# Patient Record
Sex: FEMALE
[De-identification: names, ages, dates, MRNs, and addresses within clinical notes are randomized per-mention and may not be internally consistent; named-entity substitution may affect disease eponyms.]

---

## 2021-11-29 ENCOUNTER — RESULT REVIEW (OUTPATIENT)
Age: 71
End: 2021-11-29

## 2022-11-08 ENCOUNTER — APPOINTMENT (OUTPATIENT)
Dept: AFTER HOURS CARE | Facility: EMERGENCY ROOM | Age: 72
End: 2022-11-08

## 2022-11-08 DIAGNOSIS — U07.1 COVID-19: ICD-10-CM

## 2022-11-08 PROBLEM — Z00.00 ENCOUNTER FOR PREVENTIVE HEALTH EXAMINATION: Status: ACTIVE | Noted: 2022-11-08

## 2022-11-08 PROCEDURE — 99204 OFFICE O/P NEW MOD 45 MIN: CPT | Mod: CS,95

## 2022-11-08 RX ORDER — NIRMATRELVIR AND RITONAVIR 300-100 MG
20 X 150 MG & KIT ORAL
Qty: 1 | Refills: 0 | Status: ACTIVE | COMMUNITY
Start: 2022-11-08 | End: 1900-01-01

## 2022-11-08 NOTE — PHYSICAL EXAM
[No Acute Distress] : no acute distress [No Respiratory Distress] : no respiratory distress  [Speech Grossly Normal] : speech grossly normal

## 2022-11-08 NOTE — PLAN
[With new medications prescribed] : Treat in place: with new medications prescribed [FreeTextEntry1] : Paxlovid\par \par You were evaluated during your Telehealth visit for symptoms of the COVID-19 virus. Per [facility]’s infectious disease treatment guidelines, you are a candidate for treatment with the anti-viral medication Paxlovid. Please seek medical attention if you develop worsening shortness of breath, vomiting, or you have any concerns.\par \par Information about Paxlovid: [email or web address] Fact sheet: https://www.covid19oralrx-[email or web address]/files/Clean_EUA-Fact-Sheet-for-Patients,-Parents,-and-Caregivers-COVID-19-Oral-Antiviral.pdf\par \par [facility] \par "Patient education: COVID-19 overview (The Basics)\par \par Written by the doctors and editors at [facility]\par Please read the Disclaimer at the end of this page.\par \par What is COVID-19?\par COVID-19 stands for "coronavirus disease 2019." It is caused by a virus called SARS-CoV-2. The virus first appeared in late 2019 and quickly spread around the world.\par \par What are the symptoms of COVID-19?\par Symptoms usually start 4 or 5 days after a person is infected with the virus. But in some people, it can take up to 2 weeks for symptoms to appear. Some people never show symptoms at all.\par \par When symptoms do happen, they can include:\par \par ?Fever\par ?Cough\par ?Trouble breathing\par ?Feeling tired\par ?Shaking chills\par ?Muscle aches\par ?Headache\par ?Sore throat\par ?Runny or stuffy nose\par ?Problems with sense of smell or taste\par Some people have digestive problems like nausea or diarrhea. There have also been some reports of rashes or other skin symptoms. For example, some people with COVID-19 get reddish-purple spots on their fingers or toes. But it's not clear why or how often this happens.\par \par For most people, symptoms will get better within a few days to weeks. But a small number of people get very sick and stop being able to breathe on their own. In severe cases, their organs stop working, which can lead to death.\par \par Some people with COVID-19 continue to have some symptoms for weeks or months. This seems to be more likely in people who are sick enough to need to stay in the hospital. But this can also happen in people who did not get very sick. Doctors are still learning about the long-term effects of COVID-19.\par \par While children can get COVID-19, they are less likely than adults to have severe symptoms. More information about COVID-19 and children is available separately. (See "Patient education: COVID-19 and children (The Basics)".)\par \par Am I at risk for getting seriously ill?\par It depends on your age and health. In some people, COVID-19 leads to serious problems like pneumonia, not getting enough oxygen, heart problems, or even death. This risk gets higher as people get older. It is also higher in people who have other health problems like serious heart disease, chronic kidney disease, type 2 diabetes, chronic obstructive pulmonary disease (COPD), sickle cell disease, or obesity. People who have a weak immune system for other reasons (for example, HIV infection or certain medicines), asthma, cystic fibrosis, type 1 diabetes, or high blood pressure might also be at higher risk for serious problems.\par \par How is COVID-19 spread?\par The virus that causes COVID-19 mainly spreads from person to person. This usually happens when an infected person coughs, sneezes, or talks near other people. The virus is passed through tiny particles from the infected person's lungs and airway. These particles can easily travel through the air to other people who are nearby. In some cases, like in indoor spaces where the same air keeps being blown around, virus in the particles might be able to spread to other people who are farther away.\par \par The virus can be passed easily between people who live together. But it can also spread at gatherings where people are talking close together, shaking hands, hugging, sharing food, or even singing together. Eating at restaurants raises the risk of infection, since people tend to be close to each other and not covering their faces. Doctors also think it is possible to get infected if you touch a surface that has the virus on it and then touch your mouth, nose, or eyes. However, this is probably not very common.\par \par A person can be infected, and spread the virus to others, even without having any symptoms.\par \par What are variants?\par Viruses constantly change or "mutate." When this happens, a new strain or "variant" can form. Most of the time, new variants do not change the way a virus works. But when a variant has changes in important parts of the virus, it can act differently.\par \par Experts have discovered several new variants of the virus that causes COVID-19. Some variants seem to spread more easily than the original virus. Certain variants might also make people sicker than others.\par \par Experts are studying the different variants. This will help them better understand how far they have spread, whether they affect people differently, and how well different vaccines protect against them.\par \par The more people who get vaccinated against COVID-19, the harder it will be for the virus to form new variants.\par \par Is there a test for the virus that causes COVID-19?\par Yes. If your doctor or nurse suspects you have COVID-19, they might take a swab from inside your nose or mouth for testing. In some cases, they might take a sample of your saliva. These tests can help your doctor figure out if you have COVID-19 or another illness.\par \par There are 2 types of tests used to diagnose COVID-19:\par \par ?Molecular tests – These look for the genetic material from the virus. They are also called "nucleic acid tests." You can get a molecular test at a doctor's office, clinic, or pharmacy. There are also places that make these tests available for lots of people, often at drive-through locations. Depending on the lab, it can take up to several days to get test results back.\par Molecular tests are the best way to know if a person has COVID-19. That's because they can detect even very low levels of virus in the body.\par ?Antigen tests – These look for proteins from the virus. They can give results faster than most molecular tests. You can do an antigen test at a doctor's office, clinic, pharmacy, or through some organizations that make testing available in other places. You can also buy antigen tests to use at home.\par Antigen tests are not as accurate as molecular tests. They are more likely to give "false negative" results. This is when the test comes back negative even though the person actually is infected. But antigen tests can still be useful in some situations, when results are needed quickly or a molecular test is not available. For example, if a person has early symptoms of COVID-19, an antigen test can be accurate enough to detect virus in their body. If a person gets an antigen test and the result is negative, a molecular test might be needed to confirm they do not have the virus in their body. This might be done if the person has symptoms or knows they were exposed the virus.\par There is also a blood test that can show if a person has had COVID-19 in the past. This is called an "antibody" test. Antibody tests are generally not used on their own to diagnose COVID-19 or make decisions about care. But experts can use them to learn how many people in a certain area were infected without knowing it.\par \par Can COVID-19 be prevented?\par The best way to prevent COVID-19 is to get vaccinated. In the [place], people age 5 and older can get a vaccine. People age 12 and older should also get a "booster" shot to give them extra protection. People who are fully vaccinated are at much lower risk of getting sick from the virus.\par \par More information about COVID-19 vaccines and boosters is available separately. (See "Patient education: COVID-19 vaccines (The Basics)".)\par \par In addition to vaccination, there are other things you can do to help protect yourself and others. These include:\par \par ?Face masks – Wearing a mask is most important when you need to be in public around other people. Make sure your mask covers your mouth and nose.\par You can buy cloth masks and disposable masks in stores or online (figure 1). Cloth masks work best if they have several layers of fabric. Some people prefer to use "respirator" masks that can filter out even very tiny air particles. These include "N95" and "KN95" masks. They give more protection than fabric or disposable masks. These might be a better choice for people who have a weak immune system or other health conditions. Whatever type of mask you use, it's important that it fits snugly over your face with no gaps. You can improve the fit by using a mask with an adjustable nose wire, adjusting or knotting the ear loops to make it tighter, or wearing a cloth mask on top of a disposable mask.\par ?Social distancing – It's most important to avoid contact with people who are sick. But "social distancing" also means staying at least 6 feet (about 2 meters) from anyone outside your household. That's because the virus can spread easily through close contact, and it's not always possible to know who is infected.\par ?Hand washing – Wash your hands with soap and water often. This also helps protect you from other illnesses, like the flu or the common cold. Try to avoid touching your face if you have not washed your hands.\par Make sure to rub your hands with soap for at least 20 seconds, cleaning your wrists, fingernails, and in between your fingers. Then rinse your hands and dry them with a paper towel you can throw away. If you are not near a sink, you can use a hand sanitizing gel to clean your hands. The gels with at least 60 percent alcohol work the best.\par ?Staying safe when traveling – Any form of travel, especially if you spend time in crowded places like airports, increases your risk of getting and spreading infection.\par Be sure to check what the rules are in the area you are visiting. For example, depending on the situation, you might need to have a negative COVID-19 test, show proof of vaccination, or "self-quarantine" for some length of time after traveling. Information about traveling to or from the [place] is available online at www.travel.state.gov.\par When can I stop wearing a mask?\par In general, experts recommend continuing to take the steps above if you are in an area where the COVID-19 "community level" is high. In the [place], you can check the level in your area here: https://www.cdc.gov/coronavirus/2019-ncov/your-health/covid-by-county.html. It's also a good idea to take extra steps to protect yourself if you are at high risk for severe illness.\par \par In places where the COVID-19 level is not high, many people wonder when it's safe to stop doing these things. The answer to this depends on:\par \par ?Your health and how likely you are to get very sick if you do get COVID-19\par ?Whether you live with people who are at high risk for serious illness\par ?How comfortable you are taking some amount of risk\par The answers to these questions will be different for everyone. Some people choose to continue to wear a mask in public or in large groups. Other people are comfortable doing some activities without a mask. Different activities have different levels of risk.\par \par You should continue to wear a mask around other people if you:\par \par ?Have symptoms that could be caused by COVID-19\par ?Have recently tested positive for the virus\par ?Have recently been exposed to COVID-19\par Some businesses and events require masks. Experts also recommend wearing a mask on airplanes, trains, buses, and other forms of public transportation.\par \par What should I do if I have symptoms?\par If you have a fever, cough, trouble breathing, or other symptoms of COVID-19, call your doctor or nurse. They will ask about your symptoms. They might also ask about any recent travel and whether you have been around anyone who might have been infected. Then they can tell you if you should come in or go somewhere else to be tested.\par \par If your symptoms are not severe, it is best to call before you go in. The staff can tell you what to do and whether you need to be seen in person. Many people with only mild symptoms should stay home and avoid other people until they get better. If you do need to go to the clinic or hospital, be sure to wear a mask. This helps protect other people. The staff might also have you wait someplace away from other people.\par \par If you are severely ill and need to go to the clinic or hospital right away, you should still call ahead if possible. This way the staff can care for you while taking steps to protect others. If you think you are having a medical emergency, call for an ambulance (in the [place] and [place], call 9-1-1).\par \par What if I feel fine but think I was exposed?\par If you think you were in close contact with someone with COVID-19, what to do next depends on whether you are vaccinated. It also depends on how long ago you got the vaccine and whether you have had a booster shot. Although people who are fully vaccinated are less likely to get sick and infect others, it can still happen. This is why it's important to take steps to lower this risk.\par \par First, think about these questions:\par \par ?Have you gotten a booster shot?\par ?Have you had 2 doses of the Pfizer or Moderna vaccine within the last 6 months?\par ?Have you had the Josh and Josh vaccine within the last 2 months?\par If you answered "yes" to any of the above questions, experts recommend doing the following after being exposed to someone with COVID-19:\par \par ?You do not need to self-quarantine. But you should wear a mask around all other people for 10 days.\par ?If possible, get tested 5 days after the exposure:\par •If your test is negative, continue to wear a mask around other people until 10 total days have passed\par •If your test is positive, stay home and "self-isolate" for at least 5 days\par ?If you start to have symptoms at any point, stay home and get tested again\par If you have not been vaccinated at all, or if you answered "no" to all of the above questions, experts recommend doing the following:\par \par ?Self-quarantine for 5 days after the exposure. This means staying home and away from other people as much as possible. If you need to be around people, like in your home, wear a mask.\par ?If possible, get tested 5 days after the exposure:\par •If your test is negative, continue to wear a mask around other people until 10 total days have passed\par •If your test is positive, continue to stay home and "self-isolate" for at least another 5 days\par ?If you start to have symptoms at any point, stay home and get tested again\par The guidance around what to do after being exposed has changed over time. That's because experts have learned more about the virus and when a person is most likely to infect others. If you are not sure whether you need to self-quarantine, or when you can go back to your normal activities, ask your doctor or nurse.\par \par How is COVID-19 treated?\par Many people will be able to stay home while they get better. But people with serious symptoms or other health problems might need to go to the hospital.\par \par ?Mild illness – Mild illness means you might have symptoms like fever and cough, but you do not have trouble breathing. Most people with COVID-19 have mild illness and can rest at home until they get better. This usually takes about 2 weeks, but it's not the same for everyone.\par If you are recovering from COVID-19, it's important to stay home and "self-isolate" while you are most likely to spread the virus. Self-isolation means staying apart from other people, even the people you live with. When you can stop self-isolation will depend on how long it has been since you had symptoms, and in some cases, whether you have had a negative test (showing that the virus is no longer in your body). If you are generally healthy and your symptoms are improving (or you don't have symptoms), experts recommend self-isolating for at least 5 days. The 5 days starts the day after you develop symptoms or get tested. After this, you should wear a mask around all other people for 5 more days.\par If you are not sure how long to self-isolate, or if you still have symptoms after 5 days, talk to your doctor or nurse. You should also check with your doctor or nurse if you have a weakened immune system.\par If you are at risk for getting seriously ill, doctors might recommend treatment even if you only have mild symptoms. This can lower your risk of getting sicker. Options might include:\par •"Antiviral" pills that you take for a few days\par •A different antiviral medicine that is given by IV\par •A treatment called "monoclonal antibodies" that is given by IV or as a shot\par Doctors also might recommend being part of a clinical trial. This is a scientific study that tests new medicines to see how well they work. Do not try any new medicines or treatments without talking to a doctor.\par ?Severe illness – If you have more severe illness with trouble breathing, you might need to stay in the hospital, possibly in the intensive care unit (also called the "ICU"). While you are there, you will most likely be in a special isolation room. Only medical staff will be allowed in the room, and they will have to wear special gowns, gloves, masks, and eye protection.\par The doctors and nurses can monitor and support your breathing and other body functions and make you as comfortable as possible. You might need extra oxygen to help you breathe easily. If you are having a very hard time breathing, you might need a breathing tube. The tube goes down your throat and into your lungs. It is connected to a machine to help you breathe, called a "ventilator." You might also get medicines that have been shown to help some people with severe COVID-19.\par What should I do if someone in my home has COVID-19?\par If someone in your home has COVID-19, there are additional things you can do to protect yourself and others:\par \par ?Keep the sick person away from others – The sick person should stay in a separate room, and use a different bathroom if possible. They should also eat in their own room.\par Experts also recommend that the person stay away from pets in the house until they are better.\par ?Have them wear a mask – The sick person should wear a mask when they are in the same room as other people. If they can't wear a mask, you can help protect yourself by covering your face when you are in the room with them.\par ?Wash hands – Wash your hands with soap and water often.\par ?Clean often – Here are some specific things that can help:\par •Wear disposable gloves when you clean. It's also a good idea to wear gloves when you have to touch the sick person's laundry, dishes, utensils, or trash. Wash your hands after removing your gloves.\par •Regularly clean things that are touched a lot. This includes counters, bedside tables, doorknobs, computers, phones, and bathroom surfaces.\par •Clean things in your home with soap and water, but also use disinfectants on appropriate surfaces. Some cleaning products work well to kill bacteria, but not viruses, so it's important to check labels. The [place] Environmental Protection Agency (EPA) has a list of products here: www.epa.gov/pesticide-registration/list-n-disinfectants-use-against-sars-cov-2.\par What if I am pregnant?\par More information about COVID-19 and pregnancy is available separately. (See "Patient education: COVID-19 and pregnancy (The Basics)".)\par \par If you are pregnant and you have questions about COVID-19, talk to your doctor, nurse, or midwife. They can help.\par \par How can I take care of my mental health?\par The COVID-19 pandemic has affected everyone in different ways. Many people have had to deal with being ill or caring for others who are sick. Others have lost family members or friends to COVID-19. And most people have had to deal with their lives changing in some way, sometimes permanently.\par \par It's normal to be tired of thinking about the pandemic, or to feel overwhelmed by the changing rules and guidelines. You can take care of yourself by trying to:\par \par ?Get regular exercise and eat healthy foods\par ?Get plenty of sleep\par ?Find healthy ways to handle stress, like hobbies you enjoy\par ?Find safe ways to connect with friends and family members\par If you are struggling to cope, help is available. Talk to your doctor or nurse if you feel very sad or anxious. They can recommend things that can help, or connect you with mental health resources.\par \par Where can I go to learn more?\par As we learn more about this virus, expert recommendations will continue to change. Check with your doctor or public health official to get the most updated information about how to protect yourself and others.\par \par For information about COVID-19 in your area, you can call your local public health office. In the [place], this usually means your city or town's Board of Health. Many states also have a "hotline" phone number you can call.\par \par You can find more information about COVID-19 at the following websites:\par \par ?[place] Centers for Disease Control and Prevention (CDC): www.cdc.gov/COVID19\par ?World Health Organization (WHO): www.who.int/emergencies/diseases/novel-coronavirus-2019"\par

## 2022-11-08 NOTE — HISTORY OF PRESENT ILLNESS
[Home] : at home, [unfilled] , at the time of the visit. [Other Location: e.g. Home (Enter Location, City,State)___] : at [unfilled] [Verbal consent obtained from patient] : the patient, [unfilled] [FreeTextEntry8] : 72F presents COVID day #2. Pt had COVID in 2019 which lasted one month.\par Sx: sore throat, general weakness\par No fever, chills, cough, N/V\par Home rapid test was positive 11/7/22\par PMH: cholesterol, mitral valve prolapse, cystitis\par Meds: Nitrofurantoin, simvastatin, Lexapro, spironolactone  \par Allergies: NKDA\par Vaccine status: x3\par \par (Teladoc EROD)

## 2022-11-08 NOTE — ASSESSMENT
[FreeTextEntry1] : COVID day 2 without respiratory distress. Tolerating PO. Age as mild risk factor for disease. Pros and cons of Paxlovid discussed. Pt would like proceed with Paxlovid. [place] interaction  completed. Pt to hold Simvastatin for 8 days. Isolation guidelines discussed.

## 2022-12-07 ENCOUNTER — RESULT REVIEW (OUTPATIENT)
Age: 72
End: 2022-12-07

## 2022-12-12 ENCOUNTER — APPOINTMENT (OUTPATIENT)
Dept: ULTRASOUND IMAGING | Facility: CLINIC | Age: 72
End: 2022-12-12

## 2022-12-12 ENCOUNTER — OUTPATIENT (OUTPATIENT)
Dept: OUTPATIENT SERVICES | Facility: HOSPITAL | Age: 72
LOS: 1 days | End: 2022-12-12

## 2022-12-12 PROCEDURE — 76830 TRANSVAGINAL US NON-OB: CPT | Mod: 26

## 2022-12-12 PROCEDURE — 76856 US EXAM PELVIC COMPLETE: CPT | Mod: 26

## 2023-01-05 ENCOUNTER — APPOINTMENT (OUTPATIENT)
Dept: GYNECOLOGIC ONCOLOGY | Facility: CLINIC | Age: 73
End: 2023-01-05
Payer: MEDICARE

## 2023-01-05 ENCOUNTER — NON-APPOINTMENT (OUTPATIENT)
Age: 73
End: 2023-01-05

## 2023-01-05 VITALS
WEIGHT: 142 LBS | HEART RATE: 72 BPM | DIASTOLIC BLOOD PRESSURE: 64 MMHG | OXYGEN SATURATION: 96 % | TEMPERATURE: 98.2 F | BODY MASS INDEX: 22.29 KG/M2 | HEIGHT: 67 IN | SYSTOLIC BLOOD PRESSURE: 102 MMHG

## 2023-01-05 DIAGNOSIS — R87.613 HIGH GRADE SQUAMOUS INTRAEPITHELIAL LESION ON CYTOLOGIC SMEAR OF CERVIX (HGSIL): ICD-10-CM

## 2023-01-05 PROCEDURE — 99204 OFFICE O/P NEW MOD 45 MIN: CPT

## 2023-01-05 NOTE — DISCUSSION/SUMMARY
[FreeTextEntry1] : With the aid of diagrams we reviewed the findings in detail.  We reviewed HPV its pathogenesis and the implications of an abnormal cervical cytology and the pathogenesis of dysplasia in detail. \par \par It has been reported that 40 to 58 percent of JEFF 2 lesions will regress if left untreated, while 22 percent progress to JEFF 3, and 5 percent progress to invasive cancer. ASCCP guidelines were reviewed with the patient. 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 \par \par The risks and benefits of LEEP vs. CKC were discussed which include, but are not limited to: bleeding, infection, cervical stenosis, cervical insufficiency. Possibility of needing a repeat procedure or hysterectomy was also discussed. I also discussed the possibility that no abnormality will be seen on the LEEP specimen.\par \par Office LEEP procedure is recommended in this case.\par Patient to schedule at her convenience. One of our LEEP supplies is on back order may need to be in February.

## 2023-01-05 NOTE — HISTORY OF PRESENT ILLNESS
[FreeTextEntry1] : Problem List \par 1) High grade cervical dysplasia\par \par Previous Therapy\par 1) LEEP for HGCIN in 2007\par 2) Pap 12/7/22 nilm HPV+ \par 3)  Colposcopy 12/2022\par    a) cervix 8 CIN2\par    b) ECC wnl\par \par 73 yo referred by Dr. Simpson for consultation regarding HGCIN. Patient has history of cervical dysplasia with LEEP in 2007. \par \par NKDA\par Spironolactone (for hair protection) , Lexapro, Simvastatin\par PMx: high cholesterol, mitral valve prolapse\par Pshx: denies\par FamHx: sister breast cancer x2 48 & 60, \par Social Hx: non smoker

## 2023-01-25 ENCOUNTER — NON-APPOINTMENT (OUTPATIENT)
Age: 73
End: 2023-01-25

## 2023-02-02 ENCOUNTER — APPOINTMENT (OUTPATIENT)
Dept: GYNECOLOGIC ONCOLOGY | Facility: CLINIC | Age: 73
End: 2023-02-02

## 2023-08-21 ENCOUNTER — APPOINTMENT (OUTPATIENT)
Dept: PHYSICAL MEDICINE AND REHAB | Facility: CLINIC | Age: 73
End: 2023-08-21
Payer: MEDICARE

## 2023-08-21 VITALS
WEIGHT: 145 LBS | HEART RATE: 52 BPM | RESPIRATION RATE: 18 BRPM | HEIGHT: 67 IN | DIASTOLIC BLOOD PRESSURE: 58 MMHG | BODY MASS INDEX: 22.76 KG/M2 | SYSTOLIC BLOOD PRESSURE: 87 MMHG

## 2023-08-21 DIAGNOSIS — M13.80 OTHER SPECIFIED ARTHRITIS, UNSPECIFIED SITE: ICD-10-CM

## 2023-08-21 DIAGNOSIS — M18.9 OSTEOARTHRITIS OF FIRST CARPOMETACARPAL JOINT, UNSPECIFIED: ICD-10-CM

## 2023-08-21 DIAGNOSIS — M25.542 PAIN IN JOINTS OF LEFT HAND: ICD-10-CM

## 2023-08-21 DIAGNOSIS — M23.8X1 OTHER INTERNAL DERANGEMENTS OF RIGHT KNEE: ICD-10-CM

## 2023-08-21 DIAGNOSIS — M25.461 EFFUSION, RIGHT KNEE: ICD-10-CM

## 2023-08-21 DIAGNOSIS — M23.8X2 OTHER INTERNAL DERANGEMENTS OF RIGHT KNEE: ICD-10-CM

## 2023-08-21 DIAGNOSIS — R52 PAIN, UNSPECIFIED: ICD-10-CM

## 2023-08-21 DIAGNOSIS — M15.9 POLYOSTEOARTHRITIS, UNSPECIFIED: ICD-10-CM

## 2023-08-21 DIAGNOSIS — M62.551 MUSCLE WASTING AND ATROPHY, NOT ELSEWHERE CLASSIFIED, RIGHT THIGH: ICD-10-CM

## 2023-08-21 DIAGNOSIS — Z78.9 OTHER SPECIFIED HEALTH STATUS: ICD-10-CM

## 2023-08-21 DIAGNOSIS — M24.562 CONTRACTURE, LEFT KNEE: ICD-10-CM

## 2023-08-21 DIAGNOSIS — M24.561 CONTRACTURE, RIGHT KNEE: ICD-10-CM

## 2023-08-21 DIAGNOSIS — M79.645 PAIN IN LEFT FINGER(S): ICD-10-CM

## 2023-08-21 DIAGNOSIS — M25.462 EFFUSION, RIGHT KNEE: ICD-10-CM

## 2023-08-21 DIAGNOSIS — M17.10 UNILATERAL PRIMARY OSTEOARTHRITIS, UNSPECIFIED KNEE: ICD-10-CM

## 2023-08-21 DIAGNOSIS — Z86.39 PERSONAL HISTORY OF OTHER ENDOCRINE, NUTRITIONAL AND METABOLIC DISEASE: ICD-10-CM

## 2023-08-21 PROCEDURE — 99204 OFFICE O/P NEW MOD 45 MIN: CPT

## 2023-08-21 RX ORDER — SIMVASTATIN 10 MG/1
10 TABLET, FILM COATED ORAL
Refills: 0 | Status: ACTIVE | COMMUNITY

## 2023-08-21 RX ORDER — ESCITALOPRAM OXALATE 5 MG/1
TABLET, FILM COATED ORAL
Refills: 0 | Status: ACTIVE | COMMUNITY

## 2023-08-21 RX ORDER — SPIRONOLACTONE 100 MG/1
100 TABLET ORAL
Refills: 0 | Status: ACTIVE | COMMUNITY

## 2023-08-21 RX ORDER — BIOTIN 1000 MCG
TABLET,CHEWABLE ORAL
Refills: 0 | Status: ACTIVE | COMMUNITY

## 2023-08-21 RX ORDER — MINOXIDIL 2.5 MG/1
TABLET ORAL
Refills: 0 | Status: ACTIVE | COMMUNITY

## 2023-08-21 NOTE — REVIEW OF SYSTEMS
[Patient Intake Form Reviewed] : Patient intake form was reviewed [Difficulty Walking] : difficulty walking [Anxiety] : anxiety [Fever] : no fever [Chest Pain] : no chest pain [Leg Claudication] : no intermittent leg claudication [Shortness Of Breath] : no shortness of breath

## 2023-08-21 NOTE — ASSESSMENT
[FreeTextEntry1] :  PLAN AND RECOMMENDATIONS :  We discussed differential diagnosis and clinical impression she has knee arthropathy advanced clinically with crepitus and contractures and chronic pain angel stairs she has fleeting arthralgias consider rheum work up  she has tried PT 2 years synvisc and PRP injections no help  we discussed surgery  consider partial knee replacement  given ref to Dr PORTER  The patient was given handouts to read on this condition,helpful hints ,exercises etc  handout  on OA  splint to use for CMC OA   all questions answered Recommend .symptomatic care and support brace    medications NSAIDS as needed -  OTC fine (-personal preference )-(once or twice a day), -warned of  possible GI side effects -advised to take with meals or add over the counter Nexium, if sensitive  imaging   referral to ortho knee as a abhishek   hydrotherapy /heat / cold for pain  continue  precautions including care with bending, lifting, twisting and  carrying.  relative rest and avoidance of painful activity where possible   increasing activity as discussed   return for follow up prn    The patient had the opportunity to ask questions and all were answered to their satisfaction. We will coordinate treatment with the other members of the treatment team. The patient verbalized understanding of the management/plan rationale and agreed with my recommendations. Total clinician time on the date of this encounter was at least 45 minutes- including  1 preparing to see the patient and review of prior notes, tests and other records  2 obtaining and reviewing and/ or confirming the history 3 performing a medically necessary, pertinent  and appropriate examination  4 ordering medications and supplements explaining side effects to look for ,tests,procedures,therapy and or specialty referrals 5 explaining the diagnosis or differential to the patient  6 communicating with other physicians or providers before during or after the visit. wll send note to her doctors

## 2023-08-21 NOTE — HISTORY OF PRESENT ILLNESS
[FreeTextEntry1] : Ms. BETH GUAJARDO is a very pleasant 72 year female who seen for evaluation of leg pain s knee pains as well as chronic low back pains   that has been ongoing for 20 years   without any specific injury or inciting event.  started crestor 6 years ago feels so much worst with statin  she has tried to stop or use other statins but her cardiologist is concerned as she has mitral valve issues The pain is located primarily legs  feet back ill-defined  intermittent in nature and described as cramping  the pain can last an hour to days  she thought it was restless legs syndrome she saw [facility] had PRP and synvisc injections into knees minimal help  can be either leg comes and goes randomly  knee pain worst on stairs  she had 2 years of PT  she does ex bike daily  she is a retired  from [employer]   . The pain is rated as 0/10 during today's visit, and ranges from 0-4/10. The patient's symptoms are aggravated by shopping walking cleaning   and alleviated by sleep . The patient denies any night pain, numbness/tingling, weakness, or bowel/bladder dysfunction. The patient has no other complaints at this time. her podiatrist thought these pain s referred  from her LS spine radicular  last week her left hand was painful middle finger stiff sore after one hour wore off  she is RHD

## 2023-08-21 NOTE — REASON FOR VISIT
[Initial Evaluation] : an initial evaluation [FreeTextEntry1] : ref by Dr WILSON many site leg pains 20 years

## 2023-08-21 NOTE — CONSULT LETTER
[FreeTextEntry1] : Dear Dr. MARLEN GRIDER  , DR WILSON   I had the pleasure of evaluating your patient, BETH GUAJARDO .  Thank you very much for allowing me to participate in the care of this patient. If you have any questions, please do not hesitate to contact me.   Sincerely,  Itz Fuentes MD   ABPMR Board Certified in Physical Medicine and Rehabilitation Certified Fellow of AANEM (Neuromuscular and Electrodiagnostic Medicine) Subspecialty certified in Sports Medicine (ABPMR)   of Physical Medicine and Rehabilitation [facility]

## 2023-08-21 NOTE — PHYSICAL EXAM
[FreeTextEntry1] : PHYSICAL EXAM : OBJECTIVE   GENERAL : Awake ,alert and oriented to time place and person  HEAD : normocephalic and atraumatic  NECK : supple ,no tracheal deviation ,no thyroid enlargement noted with swallowing EYES : sclera and conjunctiva normal no redness,intact extraocular movements  ENT  : ears and nose normal in appearance -hearing adequate  PULMONARY: effort normal. No respiratory distress. breathing regular. No wheezes  LYMPH : No swelling in limbs, capillary return within normal range  CVS : warm extremities,no palpitations,not short of breath, no visible jugular venous distention PSYCH : mood and affect normal ,good eye contact ,normal attention  ABDOMEN : no visible distension ,  NEUROLOGICAL:cranial nerves intact,muscle tone normal,gait and balance safe except where noted below  SKIN : warm and dry No rash detected over specific body areas examined  MUSCULOSKELETAL: normal muscle bulk, no focal bony tenderness /posture normal except where specified below hip ROM full pain free knees with crepitus ++ ROM minus 15 matthew extension flexion 90  flexion contracture  R bakers cyst  wasting R thigh VM gait NL no cane  spine ROM ok for age  No long tract signs found on clinical exam and no focal neurological deficits ++ CMC OA left thumb not tender but deformity noted  grasp 5/5

## 2023-09-28 DIAGNOSIS — M25.561 PAIN IN RIGHT KNEE: ICD-10-CM

## 2023-09-28 DIAGNOSIS — G89.29 PAIN IN RIGHT KNEE: ICD-10-CM

## 2023-09-28 DIAGNOSIS — M25.562 PAIN IN LEFT KNEE: ICD-10-CM

## 2023-09-28 DIAGNOSIS — M17.11 UNILATERAL PRIMARY OSTEOARTHRITIS, RIGHT KNEE: ICD-10-CM

## 2023-09-28 DIAGNOSIS — G89.29 PAIN IN LEFT KNEE: ICD-10-CM

## 2023-09-29 ENCOUNTER — APPOINTMENT (OUTPATIENT)
Dept: ORTHOPEDIC SURGERY | Facility: CLINIC | Age: 73
End: 2023-09-29
Payer: MEDICARE

## 2023-09-29 VITALS
SYSTOLIC BLOOD PRESSURE: 107 MMHG | WEIGHT: 145 LBS | BODY MASS INDEX: 22.76 KG/M2 | HEART RATE: 60 BPM | HEIGHT: 67 IN | DIASTOLIC BLOOD PRESSURE: 68 MMHG | OXYGEN SATURATION: 97 %

## 2023-09-29 DIAGNOSIS — Z86.79 PERSONAL HISTORY OF OTHER DISEASES OF THE CIRCULATORY SYSTEM: ICD-10-CM

## 2023-09-29 DIAGNOSIS — Z63.5 DISRUPTION OF FAMILY BY SEPARATION AND DIVORCE: ICD-10-CM

## 2023-09-29 DIAGNOSIS — M17.11 UNILATERAL PRIMARY OSTEOARTHRITIS, RIGHT KNEE: ICD-10-CM

## 2023-09-29 DIAGNOSIS — Z82.49 FAMILY HISTORY OF ISCHEMIC HEART DISEASE AND OTHER DISEASES OF THE CIRCULATORY SYSTEM: ICD-10-CM

## 2023-09-29 DIAGNOSIS — Z82.3 FAMILY HISTORY OF STROKE: ICD-10-CM

## 2023-09-29 DIAGNOSIS — M17.12 UNILATERAL PRIMARY OSTEOARTHRITIS, LEFT KNEE: ICD-10-CM

## 2023-09-29 DIAGNOSIS — Z82.69 FAMILY HISTORY OF OTHER DISEASES OF THE MUSCULOSKELETAL SYSTEM AND CONNECTIVE TISSUE: ICD-10-CM

## 2023-09-29 DIAGNOSIS — M62.552 MUSCLE WASTING AND ATROPHY, NOT ELSEWHERE CLASSIFIED, LEFT THIGH: ICD-10-CM

## 2023-09-29 DIAGNOSIS — Z87.898 PERSONAL HISTORY OF OTHER SPECIFIED CONDITIONS: ICD-10-CM

## 2023-09-29 DIAGNOSIS — Z87.440 PERSONAL HISTORY OF URINARY (TRACT) INFECTIONS: ICD-10-CM

## 2023-09-29 DIAGNOSIS — M25.562 PAIN IN LEFT KNEE: ICD-10-CM

## 2023-09-29 PROCEDURE — 99205 OFFICE O/P NEW HI 60 MIN: CPT

## 2023-09-29 RX ORDER — NITROFURANTOIN 50 MG/5ML
50 SUSPENSION ORAL
Refills: 0 | Status: ACTIVE | COMMUNITY

## 2023-09-29 RX ORDER — MINOXIDIL 2.5 MG/1
2.5 TABLET ORAL
Refills: 0 | Status: ACTIVE | COMMUNITY

## 2023-09-29 RX ORDER — ESTRADIOL 0.1 MG/D
0.1 PATCH, EXTENDED RELEASE TRANSDERMAL
Refills: 0 | Status: ACTIVE | COMMUNITY

## 2023-09-29 SDOH — SOCIAL STABILITY - SOCIAL INSECURITY: DISRUPTION OF FAMILY BY SEPARATION AND DIVORCE: Z63.5

## 2025-04-01 ENCOUNTER — NON-APPOINTMENT (OUTPATIENT)
Age: 75
End: 2025-04-01

## 2025-04-01 ENCOUNTER — APPOINTMENT (OUTPATIENT)
Dept: NEUROLOGY | Age: 75
End: 2025-04-01
Payer: MEDICARE

## 2025-04-01 VITALS
SYSTOLIC BLOOD PRESSURE: 105 MMHG | WEIGHT: 145 LBS | BODY MASS INDEX: 22.76 KG/M2 | RESPIRATION RATE: 17 BRPM | TEMPERATURE: 98 F | HEART RATE: 66 BPM | DIASTOLIC BLOOD PRESSURE: 66 MMHG | HEIGHT: 67 IN | OXYGEN SATURATION: 97 %

## 2025-04-01 DIAGNOSIS — R47.89 OTHER SPEECH DISTURBANCES: ICD-10-CM

## 2025-04-01 PROCEDURE — 99205 OFFICE O/P NEW HI 60 MIN: CPT

## 2025-04-01 PROCEDURE — G2211 COMPLEX E/M VISIT ADD ON: CPT

## 2025-04-25 ENCOUNTER — OUTPATIENT (OUTPATIENT)
Dept: OUTPATIENT SERVICES | Facility: HOSPITAL | Age: 75
LOS: 1 days | End: 2025-04-25

## 2025-04-25 ENCOUNTER — APPOINTMENT (OUTPATIENT)
Dept: MRI IMAGING | Facility: CLINIC | Age: 75
End: 2025-04-25
Payer: MEDICARE

## 2025-04-25 PROCEDURE — 76377 3D RENDER W/INTRP POSTPROCES: CPT | Mod: 26

## 2025-04-25 PROCEDURE — 70551 MRI BRAIN STEM W/O DYE: CPT | Mod: 26

## 2025-05-08 ENCOUNTER — NON-APPOINTMENT (OUTPATIENT)
Age: 75
End: 2025-05-08

## 2025-05-12 ENCOUNTER — APPOINTMENT (OUTPATIENT)
Dept: NEUROLOGY | Facility: CLINIC | Age: 75
End: 2025-05-12